# Patient Record
Sex: FEMALE | Race: WHITE | NOT HISPANIC OR LATINO | Employment: FULL TIME | URBAN - METROPOLITAN AREA
[De-identification: names, ages, dates, MRNs, and addresses within clinical notes are randomized per-mention and may not be internally consistent; named-entity substitution may affect disease eponyms.]

---

## 2023-11-30 LAB
NON-UH HIE A/G RATIO: 1.1
NON-UH HIE ALB: 3.8 G/DL (ref 3.4–5)
NON-UH HIE ALK PHOS: 119 UNIT/L (ref 45–117)
NON-UH HIE BILIRUBIN, TOTAL: 0.3 MG/DL (ref 0.3–1.2)
NON-UH HIE BUN/CREAT RATIO: 20
NON-UH HIE BUN: 18 MG/DL (ref 9–23)
NON-UH HIE CALCIUM: 9.4 MG/DL (ref 8.7–10.4)
NON-UH HIE CALCULATED LDL CHOLESTEROL: 140 MG/DL (ref 60–130)
NON-UH HIE CALCULATED OSMOLALITY: 284 MOSM/KG (ref 275–295)
NON-UH HIE CHLORIDE: 110 MMOL/L (ref 98–107)
NON-UH HIE CHOLESTEROL: 211 MG/DL (ref 100–200)
NON-UH HIE CO2, VENOUS: 25 MMOL/L (ref 20–31)
NON-UH HIE CREATININE: 0.9 MG/DL (ref 0.5–0.8)
NON-UH HIE GFR AA: >60
NON-UH HIE GLOBULIN: 3.5 G/DL
NON-UH HIE GLOMERULAR FILTRATION RATE: >60 ML/MIN/1.73M?
NON-UH HIE GLUCOSE: 86 MG/DL (ref 74–106)
NON-UH HIE GOT: 19 UNIT/L (ref 15–37)
NON-UH HIE GPT: 15 UNIT/L (ref 10–49)
NON-UH HIE HCT: 42.8 % (ref 36–46)
NON-UH HIE HDL CHOLESTEROL: 47 MG/DL (ref 40–60)
NON-UH HIE HGB: 14.3 G/DL (ref 12–16)
NON-UH HIE INSTR WBC ND: 8.7
NON-UH HIE K: 4.6 MMOL/L (ref 3.5–5.1)
NON-UH HIE MCH: 29.7 PG (ref 27–34)
NON-UH HIE MCHC: 33.4 G/DL (ref 32–37)
NON-UH HIE MCV: 88.9 FL (ref 80–100)
NON-UH HIE MPV: 8.8 FL (ref 7.4–10.4)
NON-UH HIE NA: 142 MMOL/L (ref 135–145)
NON-UH HIE PLATELET: 235 X10 (ref 150–450)
NON-UH HIE RBC: 4.81 X10 (ref 4.2–5.4)
NON-UH HIE RDW: 14.3 % (ref 11.5–14.5)
NON-UH HIE TOTAL CHOL/HDL CHOL RATIO: 4.5
NON-UH HIE TOTAL PROTEIN: 7.3 G/DL (ref 5.7–8.2)
NON-UH HIE TRIGLYCERIDES: 121 MG/DL (ref 30–150)
NON-UH HIE WBC: 8.7 X10 (ref 4.5–11)

## 2023-12-04 PROBLEM — E78.5 HYPERLIPIDEMIA: Status: ACTIVE | Noted: 2023-12-04

## 2023-12-04 PROBLEM — N83.209 OVARIAN CYST: Status: ACTIVE | Noted: 2023-12-04

## 2023-12-04 PROBLEM — I10 HYPERTENSION: Status: ACTIVE | Noted: 2023-12-04

## 2023-12-04 PROBLEM — M47.816 DJD (DEGENERATIVE JOINT DISEASE), LUMBAR: Status: ACTIVE | Noted: 2023-12-04

## 2023-12-04 PROBLEM — R74.8 ALKALINE PHOSPHATASE ELEVATION: Status: ACTIVE | Noted: 2023-12-04

## 2023-12-04 RX ORDER — AMLODIPINE BESYLATE 10 MG/1
1 TABLET ORAL DAILY
COMMUNITY
End: 2024-06-06 | Stop reason: SDUPTHER

## 2023-12-04 RX ORDER — LOSARTAN POTASSIUM 50 MG/1
1.5 TABLET ORAL
COMMUNITY
End: 2024-06-06 | Stop reason: SDUPTHER

## 2023-12-04 RX ORDER — ATENOLOL 25 MG/1
1 TABLET ORAL DAILY
COMMUNITY
End: 2024-06-06 | Stop reason: SDUPTHER

## 2023-12-04 NOTE — PROGRESS NOTES
"   Chief Complaint   Patient presents with    Follow-up     Pt here for 6 mo FUV.  Pt c/o numbness and tingling to bilateral hands at night.  Onset 2-3 months.   Last evaluation 6/27/2023.     N/t x 2 months bilateral hands. Right worse than the left.  Dropping things. Slight neck pain.   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     Past Medical history  Hypertension. Patient reports negative study in the past  Hyperlipidemia  Obesity  Smoker  Right ovarian cyst  Alkaline phosphatase elevated.     Past surgical history negative     Social history. Single. No children. Lives with her mother. Smokes a pack ppd      Family history: Lives with mother Aracely Rodas. She is a patient of mine. Mother has hypertension diabetes and hypothyroidism. Father with CAD and hypertension    Blood pressure 110/70, pulse 63, temperature 36.5 °C (97.7 °F), height 1.626 m (5' 4\"), weight 99.8 kg (220 lb), SpO2 99 %.  Body mass index is 37.76 kg/m².    Vital reviewed  Neck: no cervical/clavicular adenopathy  CV: RRR S1 S2 normal. No murmur  Lungs: CTA without wrr. Breath sounds symmetric  Extremities: no pretibial edema  Neuro: speech intact.   Msk: tinel negative. Phalen's test positive    Labs 11/2023 db lipid, cbc, cmp  211/47/140/121  Wbc 8.7 hg 14.3 platelet 235  Cr 0.9 glucose 86 K 4.6 liver function tests negative.     Cleo was seen today for follow-up.  Diagnoses and all orders for this visit:  Numbness and tingling (Primary)  -     EMG & nerve conduction; Future  -     Referral to Orthopaedic Surgery; Future  Hyperlipidemia, unspecified hyperlipidemia type  -     pravastatin (Pravachol) 40 mg tablet; Take 1 tablet (40 mg) by mouth once daily.  -     Lipid Panel; Future  -     Comprehensive Metabolic Panel; Future  Hypertension, unspecified type  -     CBC; Future  Amenorrhea  -     FSH; Future  -     Tsh With Reflex To Free T4 If Abnormal; Future    Ldl above goal. Increase pravastatin " from 20 to 40 mg every day  CTS discussed treatment such as splints, injection, surgery. Referral to ortho  Notify pt of NCS results when available    Mammogram has appt next week  Pap due 2026  Needs colon cancer screening.

## 2023-12-05 ENCOUNTER — OFFICE VISIT (OUTPATIENT)
Dept: PRIMARY CARE | Facility: CLINIC | Age: 48
End: 2023-12-05
Payer: COMMERCIAL

## 2023-12-05 VITALS
DIASTOLIC BLOOD PRESSURE: 70 MMHG | WEIGHT: 220 LBS | TEMPERATURE: 97.7 F | BODY MASS INDEX: 37.56 KG/M2 | SYSTOLIC BLOOD PRESSURE: 110 MMHG | HEART RATE: 63 BPM | OXYGEN SATURATION: 99 % | HEIGHT: 64 IN

## 2023-12-05 DIAGNOSIS — N91.2 AMENORRHEA: ICD-10-CM

## 2023-12-05 DIAGNOSIS — E78.5 HYPERLIPIDEMIA, UNSPECIFIED HYPERLIPIDEMIA TYPE: ICD-10-CM

## 2023-12-05 DIAGNOSIS — R20.0 NUMBNESS AND TINGLING: Primary | ICD-10-CM

## 2023-12-05 DIAGNOSIS — R20.2 NUMBNESS AND TINGLING: Primary | ICD-10-CM

## 2023-12-05 DIAGNOSIS — I10 HYPERTENSION, UNSPECIFIED TYPE: ICD-10-CM

## 2023-12-05 PROCEDURE — 3078F DIAST BP <80 MM HG: CPT | Performed by: INTERNAL MEDICINE

## 2023-12-05 PROCEDURE — 99214 OFFICE O/P EST MOD 30 MIN: CPT | Performed by: INTERNAL MEDICINE

## 2023-12-05 PROCEDURE — 3074F SYST BP LT 130 MM HG: CPT | Performed by: INTERNAL MEDICINE

## 2023-12-05 RX ORDER — PRAVASTATIN SODIUM 20 MG/1
20 TABLET ORAL DAILY
COMMUNITY
Start: 2023-04-07 | End: 2023-12-05 | Stop reason: DRUGHIGH

## 2023-12-05 RX ORDER — PRAVASTATIN SODIUM 40 MG/1
40 TABLET ORAL DAILY
Qty: 90 TABLET | Refills: 3 | Status: SHIPPED | OUTPATIENT
Start: 2023-12-05 | End: 2024-06-06 | Stop reason: SDUPTHER

## 2023-12-05 ASSESSMENT — PATIENT HEALTH QUESTIONNAIRE - PHQ9
1. LITTLE INTEREST OR PLEASURE IN DOING THINGS: NOT AT ALL
2. FEELING DOWN, DEPRESSED OR HOPELESS: NOT AT ALL
SUM OF ALL RESPONSES TO PHQ9 QUESTIONS 1 & 2: 0

## 2023-12-05 ASSESSMENT — PAIN SCALES - GENERAL: PAINLEVEL: 4

## 2023-12-21 ENCOUNTER — TELEPHONE (OUTPATIENT)
Dept: PRIMARY CARE | Facility: CLINIC | Age: 48
End: 2023-12-21
Payer: COMMERCIAL

## 2023-12-21 NOTE — TELEPHONE ENCOUNTER
----- Message from Aracely Wright MD sent at 12/21/2023  1:10 PM EST -----  Please let patient know mammogram is normal.

## 2024-06-04 LAB
NON-UH HIE A/G RATIO: 1
NON-UH HIE ALB: 3.8 G/DL (ref 3.4–5)
NON-UH HIE ALK PHOS: 135 UNIT/L (ref 45–117)
NON-UH HIE BILIRUBIN, TOTAL: 0.3 MG/DL (ref 0.3–1.2)
NON-UH HIE BUN/CREAT RATIO: 26.2
NON-UH HIE BUN: 21 MG/DL (ref 9–23)
NON-UH HIE CALCIUM: 9.6 MG/DL (ref 8.7–10.4)
NON-UH HIE CALCULATED LDL CHOLESTEROL: 158 MG/DL (ref 60–130)
NON-UH HIE CALCULATED OSMOLALITY: 283 MOSM/KG (ref 275–295)
NON-UH HIE CHLORIDE: 110 MMOL/L (ref 98–107)
NON-UH HIE CHOLESTEROL: 226 MG/DL (ref 100–200)
NON-UH HIE CO2, VENOUS: 24 MMOL/L (ref 20–31)
NON-UH HIE CREATININE: 0.8 MG/DL (ref 0.5–0.8)
NON-UH HIE FSH: 61 IU/L
NON-UH HIE GFR AA: >60
NON-UH HIE GLOBULIN: 3.8 G/DL
NON-UH HIE GLOMERULAR FILTRATION RATE: >60 ML/MIN/1.73M?
NON-UH HIE GLUCOSE: 83 MG/DL (ref 74–106)
NON-UH HIE GOT: 18 UNIT/L (ref 15–37)
NON-UH HIE GPT: 14 UNIT/L (ref 10–49)
NON-UH HIE HCT: 42.8 % (ref 36–46)
NON-UH HIE HDL CHOLESTEROL: 46 MG/DL (ref 40–60)
NON-UH HIE HGB: 14.5 G/DL (ref 12–16)
NON-UH HIE INSTR WBC ND: 8.2
NON-UH HIE K: 4.5 MMOL/L (ref 3.5–5.1)
NON-UH HIE MCH: 30.3 PG (ref 27–34)
NON-UH HIE MCHC: 33.8 G/DL (ref 32–37)
NON-UH HIE MCV: 89.7 FL (ref 80–100)
NON-UH HIE MPV: 9.1 FL (ref 7.4–10.4)
NON-UH HIE NA: 141 MMOL/L (ref 135–145)
NON-UH HIE PLATELET: 214 X10 (ref 150–450)
NON-UH HIE RBC: 4.77 X10 (ref 4.2–5.4)
NON-UH HIE RDW: 13.4 % (ref 11.5–14.5)
NON-UH HIE TOTAL CHOL/HDL CHOL RATIO: 4.9
NON-UH HIE TOTAL PROTEIN: 7.6 G/DL (ref 5.7–8.2)
NON-UH HIE TRIGLYCERIDES: 111 MG/DL (ref 30–150)
NON-UH HIE TSH: 3.91 UIU/ML (ref 0.55–4.78)
NON-UH HIE WBC: 8.2 X10 (ref 4.5–11)

## 2024-06-06 ENCOUNTER — OFFICE VISIT (OUTPATIENT)
Dept: PRIMARY CARE | Facility: CLINIC | Age: 49
End: 2024-06-06
Payer: COMMERCIAL

## 2024-06-06 VITALS
BODY MASS INDEX: 36.91 KG/M2 | DIASTOLIC BLOOD PRESSURE: 62 MMHG | OXYGEN SATURATION: 97 % | HEART RATE: 66 BPM | WEIGHT: 216.2 LBS | TEMPERATURE: 98 F | SYSTOLIC BLOOD PRESSURE: 110 MMHG | HEIGHT: 64 IN

## 2024-06-06 DIAGNOSIS — E55.9 VITAMIN D DEFICIENCY: ICD-10-CM

## 2024-06-06 DIAGNOSIS — I10 HYPERTENSION, UNSPECIFIED TYPE: Primary | ICD-10-CM

## 2024-06-06 DIAGNOSIS — E78.5 HYPERLIPIDEMIA, UNSPECIFIED HYPERLIPIDEMIA TYPE: ICD-10-CM

## 2024-06-06 PROCEDURE — 3078F DIAST BP <80 MM HG: CPT | Performed by: INTERNAL MEDICINE

## 2024-06-06 PROCEDURE — 99214 OFFICE O/P EST MOD 30 MIN: CPT | Performed by: INTERNAL MEDICINE

## 2024-06-06 PROCEDURE — 3074F SYST BP LT 130 MM HG: CPT | Performed by: INTERNAL MEDICINE

## 2024-06-06 RX ORDER — AMLODIPINE BESYLATE 10 MG/1
10 TABLET ORAL DAILY
Qty: 90 TABLET | Refills: 3 | Status: SHIPPED | OUTPATIENT
Start: 2024-06-06

## 2024-06-06 RX ORDER — PRAVASTATIN SODIUM 40 MG/1
40 TABLET ORAL DAILY
Qty: 90 TABLET | Refills: 3 | Status: SHIPPED | OUTPATIENT
Start: 2024-06-06

## 2024-06-06 RX ORDER — LOSARTAN POTASSIUM 50 MG/1
75 TABLET ORAL
Qty: 135 TABLET | Refills: 3 | Status: SHIPPED | OUTPATIENT
Start: 2024-06-06

## 2024-06-06 RX ORDER — ATENOLOL 25 MG/1
25 TABLET ORAL DAILY
Qty: 90 TABLET | Refills: 3 | Status: SHIPPED | OUTPATIENT
Start: 2024-06-06

## 2024-06-06 ASSESSMENT — PAIN SCALES - GENERAL: PAINLEVEL: 0-NO PAIN

## 2024-06-06 NOTE — PROGRESS NOTES
"   Chief Complaint   Patient presents with    Follow-up     Pt here for 6 mo FUV   Last evaluation 12/2023.   CTS-did not see ortho yet. Symptoms are better  Forgetting cholesterol med most days  No GI or  complaints  Taking mvi    Past Medical history  Hypertension. Patient reports negative study in the past  Hyperlipidemia  Obesity  Smoker  Right ovarian cyst  Alkaline phosphatase elevated.     Past surgical history negative     Social history. Single. No children. Lives with her mother. Smokes a pack ppd      Family history: Lives with mother Aracely Rodas. She is a patient of mine. Mother has hypertension diabetes and hypothyroidism. Father with CAD and hypertension    Blood pressure 110/62, pulse 66, temperature 36.7 °C (98 °F), height 1.626 m (5' 4\"), weight 98.1 kg (216 lb 3.2 oz), SpO2 97%.  Body mass index is 37.11 kg/m².    Vital reviewed  Neck: no cervical/clavicular adenopathy  CV: RRR S1 S2 normal. No murmur. No carotid bruit.   Lungs: CTA without wrr. Breath sounds symmetric  Abdomen: normoactive. Soft, nontender. No mass. Limited exam  Extremities: no pretibial edema  Neuro: speech intact.     Labs 6/2024 fsh,lipid, cmp, tsh, cbc  FSH 61 consistent with postmenopausal state.  Cholesterol 226 HDL 46  triglyceride 111.  Increased from prior lab work  Alk phos elevated at 135.  Previously elevated did have ultrasound in the past that showed fatty liver  Hemoglobin normal at 14.5    Labs 11/2023 db lipid, cbc, cmp  211/47/140/121  Wbc 8.7 hg 14.3 platelet 235  Cr 0.9 glucose 86 K 4.6 liver function tests negative.     1. Hyperlipidemia, unspecified hyperlipidemia type  Trouble with compliance. Does take amlodipine daily at night so can take together  - pravastatin (Pravachol) 40 mg tablet; Take 1 tablet (40 mg) by mouth once daily.  Dispense: 90 tablet; Refill: 3  - Lipid Panel; Future    2. Hypertension, unspecified type  Well controlled. Denies cv symptoms  - amLODIPine (Norvasc) 10 mg tablet; " Take 1 tablet (10 mg) by mouth once daily.  Dispense: 90 tablet; Refill: 3  - atenolol (Tenormin) 25 mg tablet; Take 1 tablet (25 mg) by mouth once daily.  Dispense: 90 tablet; Refill: 3  - losartan (Cozaar) 50 mg tablet; Take 1.5 tablets (75 mg) by mouth once daily.  Dispense: 135 tablet; Refill: 3  - Comprehensive Metabolic Panel; Future  - CBC; Future    3. Vitamin D deficiency  - Vitamin D 25-Hydroxy,Total (for eval of Vitamin D levels); Future    Mammogram 12/2023  Pap due 2026  Needs colon cancer screening.      Current Outpatient Medications on File Prior to Visit   Medication Sig Dispense Refill    amLODIPine (Norvasc) 10 mg tablet Take 1 tablet (10 mg) by mouth once daily.      atenolol (Tenormin) 25 mg tablet Take 1 tablet (25 mg) by mouth once daily.      losartan (Cozaar) 50 mg tablet Take 1.5 tablets (75 mg) by mouth once daily.      pravastatin (Pravachol) 40 mg tablet Take 1 tablet (40 mg) by mouth once daily. 90 tablet 3     No current facility-administered medications on file prior to visit.

## 2024-12-07 LAB
NON-UH HIE A/G RATIO: 1
NON-UH HIE ALB: 3.7 G/DL (ref 3.4–5)
NON-UH HIE ALK PHOS: 114 UNIT/L (ref 45–117)
NON-UH HIE BILIRUBIN, TOTAL: 0.5 MG/DL (ref 0.3–1.2)
NON-UH HIE BUN/CREAT RATIO: 15.6
NON-UH HIE BUN: 14 MG/DL (ref 9–23)
NON-UH HIE CALCIUM: 10.1 MG/DL (ref 8.7–10.4)
NON-UH HIE CALCULATED LDL CHOLESTEROL: 96 MG/DL (ref 60–130)
NON-UH HIE CALCULATED OSMOLALITY: 285 MOSM/KG (ref 275–295)
NON-UH HIE CHLORIDE: 111 MMOL/L (ref 98–107)
NON-UH HIE CHOLESTEROL: 170 MG/DL (ref 100–200)
NON-UH HIE CO2, VENOUS: 27 MMOL/L (ref 20–31)
NON-UH HIE CREATININE: 0.9 MG/DL (ref 0.5–0.8)
NON-UH HIE GFR AA: >60
NON-UH HIE GLOBULIN: 3.8 G/DL
NON-UH HIE GLOMERULAR FILTRATION RATE: >60 ML/MIN/1.73M?
NON-UH HIE GLUCOSE: 86 MG/DL (ref 74–106)
NON-UH HIE GOT: 17 UNIT/L (ref 15–37)
NON-UH HIE GPT: 11 UNIT/L (ref 10–49)
NON-UH HIE HCT: 41.5 % (ref 36–46)
NON-UH HIE HDL CHOLESTEROL: 47 MG/DL (ref 40–60)
NON-UH HIE HGB: 14 G/DL (ref 12–16)
NON-UH HIE INSTR WBC ND: 8.6
NON-UH HIE K: 4.7 MMOL/L (ref 3.5–5.1)
NON-UH HIE MCH: 30.2 PG (ref 27–34)
NON-UH HIE MCHC: 33.8 G/DL (ref 32–37)
NON-UH HIE MCV: 89.2 FL (ref 80–100)
NON-UH HIE MPV: 8.7 FL (ref 7.4–10.4)
NON-UH HIE NA: 143 MMOL/L (ref 135–145)
NON-UH HIE PLATELET: 233 X10 (ref 150–450)
NON-UH HIE RBC: 4.65 X10 (ref 4.2–5.4)
NON-UH HIE RDW: 13.6 % (ref 11.5–14.5)
NON-UH HIE TOTAL CHOL/HDL CHOL RATIO: 3.6
NON-UH HIE TOTAL PROTEIN: 7.5 G/DL (ref 5.7–8.2)
NON-UH HIE TRIGLYCERIDES: 137 MG/DL (ref 30–150)
NON-UH HIE VIT D 25: 7 NG/ML
NON-UH HIE WBC: 8.6 X10 (ref 4.5–11)

## 2024-12-10 ENCOUNTER — APPOINTMENT (OUTPATIENT)
Dept: PRIMARY CARE | Facility: CLINIC | Age: 49
End: 2024-12-10
Payer: COMMERCIAL

## 2024-12-10 VITALS
DIASTOLIC BLOOD PRESSURE: 68 MMHG | SYSTOLIC BLOOD PRESSURE: 118 MMHG | HEIGHT: 64 IN | HEART RATE: 68 BPM | BODY MASS INDEX: 38.28 KG/M2 | TEMPERATURE: 97.7 F | OXYGEN SATURATION: 98 % | WEIGHT: 224.2 LBS

## 2024-12-10 DIAGNOSIS — Z12.11 COLON CANCER SCREENING: ICD-10-CM

## 2024-12-10 DIAGNOSIS — E55.9 VITAMIN D DEFICIENCY: Primary | ICD-10-CM

## 2024-12-10 DIAGNOSIS — F17.210 CIGARETTE SMOKER MOTIVATED TO QUIT: ICD-10-CM

## 2024-12-10 DIAGNOSIS — Z12.31 ENCOUNTER FOR SCREENING MAMMOGRAM FOR BREAST CANCER: ICD-10-CM

## 2024-12-10 DIAGNOSIS — I10 HYPERTENSION, UNSPECIFIED TYPE: ICD-10-CM

## 2024-12-10 PROCEDURE — 99213 OFFICE O/P EST LOW 20 MIN: CPT | Performed by: INTERNAL MEDICINE

## 2024-12-10 PROCEDURE — 3008F BODY MASS INDEX DOCD: CPT | Performed by: INTERNAL MEDICINE

## 2024-12-10 PROCEDURE — 3074F SYST BP LT 130 MM HG: CPT | Performed by: INTERNAL MEDICINE

## 2024-12-10 PROCEDURE — 3078F DIAST BP <80 MM HG: CPT | Performed by: INTERNAL MEDICINE

## 2024-12-10 RX ORDER — ERGOCALCIFEROL 1.25 MG/1
CAPSULE ORAL
Qty: 12 CAPSULE | Refills: 0 | Status: SHIPPED | OUTPATIENT
Start: 2024-12-10

## 2024-12-10 ASSESSMENT — PAIN SCALES - GENERAL: PAINLEVEL_OUTOF10: 0-NO PAIN

## 2024-12-10 ASSESSMENT — PATIENT HEALTH QUESTIONNAIRE - PHQ9
2. FEELING DOWN, DEPRESSED OR HOPELESS: NOT AT ALL
1. LITTLE INTEREST OR PLEASURE IN DOING THINGS: NOT AT ALL
SUM OF ALL RESPONSES TO PHQ9 QUESTIONS 1 & 2: 0

## 2024-12-10 NOTE — PROGRESS NOTES
"  Chief Complaint   Patient presents with    Follow-up     Pt here for 6 mo FUV   Last evaluation  06/2024. Still smoking-aware of need to quit.  Did lab work. Does feel tired.     Past Medical history  Hypertension.   Hyperlipidemia  Obesity  Smoker  Right ovarian cyst  Alkaline phosphatase elevated.  CTS    Past surgical history negative     Social history. Single. No children. Lives with her mother. Smokes a pack ppd      Family history: Lives with mother Aracely Rodas. She is a patient of mine. Mother has hypertension diabetes and hypothyroidism. Father with CAD and hypertension    Blood pressure 118/68, pulse 68, temperature 36.5 °C (97.7 °F), height 1.626 m (5' 4\"), weight 102 kg (224 lb 3.2 oz), SpO2 98%.  Body mass index is 38.48 kg/m².    Vital reviewed  Neck: no cervical/clavicular adenopathy  CV: RRR S1 S2 normal. No murmur. No carotid bruit.   Lungs: CTA without wrr. Breath sounds symmetric  Abdomen: normoactive. Soft, nontender. No mass. Limited exam  Extremities: no pretibial edema  Neuro: speech intact.   Skin: no rash noted.     Labs 12/2024 D, lipid, CMP, cbc  D low 7  Cr 0.9 Glucose 86 liver function tests negative  Wbc 8.6 hg 14 platelet 233  170/47/96/137    Labs 6/2024 fsh, lipid, cmp, tsh, cbc  FSH 61 consistent with postmenopausal state.  Cholesterol 226 HDL 46  triglyceride 111.  Increased from prior lab work  Alk phos elevated at 135.  Previously elevated did have ultrasound in the past that showed fatty liver  Hemoglobin normal at 14.5    Labs 11/2023 db lipid, cbc, cmp  211/47/140/121  Wbc 8.7 hg 14.3 platelet 235  Cr 0.9 glucose 86 K 4.6 liver function tests negative.     1. Vitamin D deficiency (Primary)  - ergocalciferol (Vitamin D2) 1.25 MG (57804 UT) capsule; 1 po weekly for 12 weeks. After completing take otc vitamin D 2000 units a day.  Dispense: 12 capsule; Refill: 0  - Vitamin D 25-Hydroxy,Total (for eval of Vitamin D levels); Future    2. Encounter for screening mammogram " for breast cancer  - BI mammo bilateral screening tomosynthesis; Future    3. Colon cancer screening  Discussed options. She prefers scope.   - Colonoscopy Screening; Average Risk Patient; Future    4. Cigarette smoker motivated to quit   CT lung screening low dose; Future    5. Hypertension, unspecified type  Bp within goal. Asymptomatic. Labs in 6 months. Reviewed 12/2024 lab  - Comprehensive Metabolic Panel; Future    Mammogram 12/2023 ordered  Pap due 2026  Needs colon cancer screening. Scope ordered  F/up 6 months and prn     Current Outpatient Medications on File Prior to Visit   Medication Sig Dispense Refill    amLODIPine (Norvasc) 10 mg tablet Take 1 tablet (10 mg) by mouth once daily. 90 tablet 3    atenolol (Tenormin) 25 mg tablet Take 1 tablet (25 mg) by mouth once daily. 90 tablet 3    losartan (Cozaar) 50 mg tablet Take 1.5 tablets (75 mg) by mouth once daily. 135 tablet 3    pravastatin (Pravachol) 40 mg tablet Take 1 tablet (40 mg) by mouth once daily. 90 tablet 3     No current facility-administered medications on file prior to visit.

## 2025-06-09 DIAGNOSIS — I10 HYPERTENSION, UNSPECIFIED TYPE: ICD-10-CM

## 2025-06-09 DIAGNOSIS — E78.5 HYPERLIPIDEMIA, UNSPECIFIED HYPERLIPIDEMIA TYPE: ICD-10-CM

## 2025-06-09 RX ORDER — LOSARTAN POTASSIUM 50 MG/1
75 TABLET ORAL
Qty: 135 TABLET | Refills: 3 | Status: SHIPPED | OUTPATIENT
Start: 2025-06-09

## 2025-06-09 RX ORDER — ATENOLOL 25 MG/1
25 TABLET ORAL DAILY
Qty: 90 TABLET | Refills: 3 | Status: SHIPPED | OUTPATIENT
Start: 2025-06-09

## 2025-06-09 RX ORDER — AMLODIPINE BESYLATE 10 MG/1
10 TABLET ORAL DAILY
Qty: 90 TABLET | Refills: 3 | Status: SHIPPED | OUTPATIENT
Start: 2025-06-09

## 2025-06-09 RX ORDER — PRAVASTATIN SODIUM 40 MG/1
40 TABLET ORAL DAILY
Qty: 90 TABLET | Refills: 3 | Status: SHIPPED | OUTPATIENT
Start: 2025-06-09

## 2025-06-11 ENCOUNTER — APPOINTMENT (OUTPATIENT)
Dept: PRIMARY CARE | Facility: CLINIC | Age: 50
End: 2025-06-11
Payer: COMMERCIAL

## 2025-06-26 LAB
NON-UH HIE A/G RATIO: 1
NON-UH HIE ALB: 3.8 G/DL (ref 3.4–5)
NON-UH HIE ALK PHOS: 119 UNIT/L (ref 45–117)
NON-UH HIE BILIRUBIN, TOTAL: 0.5 MG/DL (ref 0.3–1.2)
NON-UH HIE BUN/CREAT RATIO: 23.3
NON-UH HIE BUN: 21 MG/DL (ref 9–23)
NON-UH HIE CALCIUM: 9.7 MG/DL (ref 8.7–10.4)
NON-UH HIE CALCULATED OSMOLALITY: 282 MOSM/KG (ref 275–295)
NON-UH HIE CHLORIDE: 114 MMOL/L (ref 98–107)
NON-UH HIE CO2, VENOUS: 24 MMOL/L (ref 20–31)
NON-UH HIE CREATININE: 0.9 MG/DL (ref 0.5–0.8)
NON-UH HIE GFR AA: >60
NON-UH HIE GLOBULIN: 3.8 G/DL
NON-UH HIE GLOMERULAR FILTRATION RATE: >60 ML/MIN/1.73M?
NON-UH HIE GLUCOSE: 83 MG/DL (ref 74–106)
NON-UH HIE GOT: 17 UNIT/L (ref 15–37)
NON-UH HIE GPT: 9 UNIT/L (ref 10–49)
NON-UH HIE K: 4 MMOL/L (ref 3.5–5.1)
NON-UH HIE NA: 140 MMOL/L (ref 135–145)
NON-UH HIE TOTAL PROTEIN: 7.6 G/DL (ref 5.7–8.2)
NON-UH HIE VIT D 25: 31 NG/ML

## 2025-06-30 ENCOUNTER — APPOINTMENT (OUTPATIENT)
Dept: PRIMARY CARE | Facility: CLINIC | Age: 50
End: 2025-06-30
Payer: COMMERCIAL

## 2025-06-30 ENCOUNTER — TELEPHONE (OUTPATIENT)
Dept: PRIMARY CARE | Facility: CLINIC | Age: 50
End: 2025-06-30

## 2025-06-30 VITALS
BODY MASS INDEX: 37.05 KG/M2 | WEIGHT: 217 LBS | TEMPERATURE: 98.1 F | DIASTOLIC BLOOD PRESSURE: 70 MMHG | OXYGEN SATURATION: 99 % | HEART RATE: 71 BPM | SYSTOLIC BLOOD PRESSURE: 108 MMHG | HEIGHT: 64 IN

## 2025-06-30 DIAGNOSIS — E66.812 CLASS 2 OBESITY WITH BODY MASS INDEX (BMI) OF 37.0 TO 37.9 IN ADULT, UNSPECIFIED OBESITY TYPE, UNSPECIFIED WHETHER SERIOUS COMORBIDITY PRESENT: ICD-10-CM

## 2025-06-30 DIAGNOSIS — E55.9 VITAMIN D DEFICIENCY: Primary | ICD-10-CM

## 2025-06-30 DIAGNOSIS — Z12.31 ENCOUNTER FOR SCREENING MAMMOGRAM FOR BREAST CANCER: ICD-10-CM

## 2025-06-30 DIAGNOSIS — E78.5 HYPERLIPIDEMIA, UNSPECIFIED HYPERLIPIDEMIA TYPE: ICD-10-CM

## 2025-06-30 DIAGNOSIS — F17.210 CIGARETTE SMOKER MOTIVATED TO QUIT: ICD-10-CM

## 2025-06-30 DIAGNOSIS — I10 HYPERTENSION, UNSPECIFIED TYPE: ICD-10-CM

## 2025-06-30 DIAGNOSIS — Z00.00 ROUTINE GENERAL MEDICAL EXAMINATION AT A HEALTH CARE FACILITY: ICD-10-CM

## 2025-06-30 PROCEDURE — 3074F SYST BP LT 130 MM HG: CPT | Performed by: INTERNAL MEDICINE

## 2025-06-30 PROCEDURE — 99396 PREV VISIT EST AGE 40-64: CPT | Performed by: INTERNAL MEDICINE

## 2025-06-30 PROCEDURE — 3078F DIAST BP <80 MM HG: CPT | Performed by: INTERNAL MEDICINE

## 2025-06-30 PROCEDURE — 99213 OFFICE O/P EST LOW 20 MIN: CPT | Performed by: INTERNAL MEDICINE

## 2025-06-30 PROCEDURE — 3008F BODY MASS INDEX DOCD: CPT | Performed by: INTERNAL MEDICINE

## 2025-06-30 RX ORDER — CHOLECALCIFEROL (VITAMIN D3) 50 MCG
50 TABLET ORAL DAILY
COMMUNITY

## 2025-06-30 ASSESSMENT — LIFESTYLE VARIABLES
AUDIT-C TOTAL SCORE: 2
HOW OFTEN DO YOU HAVE SIX OR MORE DRINKS ON ONE OCCASION: NEVER
HOW MANY STANDARD DRINKS CONTAINING ALCOHOL DO YOU HAVE ON A TYPICAL DAY: 1 OR 2
SKIP TO QUESTIONS 9-10: 1
HOW OFTEN DO YOU HAVE A DRINK CONTAINING ALCOHOL: 2-4 TIMES A MONTH

## 2025-06-30 ASSESSMENT — PATIENT HEALTH QUESTIONNAIRE - PHQ9
SUM OF ALL RESPONSES TO PHQ9 QUESTIONS 1 & 2: 0
1. LITTLE INTEREST OR PLEASURE IN DOING THINGS: NOT AT ALL
2. FEELING DOWN, DEPRESSED OR HOPELESS: NOT AT ALL

## 2025-06-30 ASSESSMENT — PAIN SCALES - GENERAL: PAINLEVEL_OUTOF10: 0-NO PAIN

## 2025-06-30 NOTE — TELEPHONE ENCOUNTER
CT Low dose screening/35159 for SWG is approved auth# [id number] valid 6/30--8/14/25 per [facility] portal

## 2025-06-30 NOTE — PROGRESS NOTES
"  Chief Complaint   Patient presents with    Annual Exam    Follow-up     Pt here for AWV and 6 mo FUV   Last evaluation  12/2024. Still smoking-aware of need to quit. Smoking 1 ppd.  Needs new order for CT lung ca screen and mammogram  Taking otc vitamin D.  Patient denies chest pain, pressure, palpitations, or shortness of breath.  Patient denies abdominal pain. No  complaints. Denies blood in urine or stool. Occasional leak small amount of urine. .       Past Medical history  Hypertension.   Hyperlipidemia  Obesity  Smoker  Right ovarian cyst  Alkaline phosphatase elevated.  CTS  Vitamin D deficiency    Past surgical history negative     Social history. Single. No children. Lives with her mother. Smokes a pack ppd      Family history: Lives with mother Aracely Rodas.  Mother has hypertension diabetes and hypothyroidism. Father with CAD and hypertension    Blood pressure 108/70, pulse 71, temperature 36.7 °C (98.1 °F), height 1.626 m (5' 4\"), weight 98.4 kg (217 lb), SpO2 99%.  Body mass index is 37.25 kg/m².  General: NAD. Alert.  Glasses.   HEENT: Normocephalic atraumatic.    Eyes: no scleral icterus. Extraocular movements intact.  Pupils equal round and reactive to light.  Ears: TM intact.  No cerumen. Hearing grossly intact.   Throat: No exudate  Neck:  Supple. No thyroid goiter.  Lymph nodes: No cervical or clavicular adenopathy  Cardiovascular: Regular rate rhythm S1-S2 normal no murmur. No carotid bruit.   Lungs: Clear to Auscultation without wheezing, rales, or rhonchi, Breath sounds symmetric. No use of accessory muscles  Abdomen:  Normoactive bowel sounds, soft, non-tender.   Extremities: No pretibial edema  Neuro: no facial asymmetry. Strength upper and lower extremities 5/5. Sensation intact to light touch. No tremor. Babinski negative. CN III-XII grossly intact.   Skin: no rash noted  Vascular: DP pulses intact.  No cyanosis.   Declined chaperone. Breast: Both are symmetrical no nipple discharge.  No " skin changes.  No mass palpated.  No axillary adenopathy.          Labs 06/2025 D, CMP  D 31 cr 0.9 glucose 83 alk phos elevated 119.     Labs 12/2024 D, lipid, CMP, cbc  D low 7  Cr 0.9 Glucose 86 liver function tests negative  Wbc 8.6 hg 14 platelet 233  170/47/96/137    Labs 6/2024 fsh, lipid, cmp, tsh, cbc  FSH 61 consistent with postmenopausal state.  Cholesterol 226 HDL 46  triglyceride 111.  Increased from prior lab work  Alk phos elevated at 135.  Previously elevated did have ultrasound in the past that showed fatty liver  Hemoglobin normal at 14.5    Labs 11/2023 db lipid, cbc, cmp  211/47/140/121  Wbc 8.7 hg 14.3 platelet 235  Cr 0.9 glucose 86 K 4.6 liver function tests negative.     1. Vitamin D deficiency (Primary)  - Vitamin D 25-Hydroxy,Total (for eval of Vitamin D levels); Future    2. Class 2 obesity with body mass index (BMI) of 37.0 to 37.9 in adult, unspecified obesity type, unspecified whether serious comorbidity present    3. Encounter for screening mammogram for breast cancer  - BI mammo bilateral screening tomosynthesis; Future    4. Cigarette smoker motivated to quit  - CT lung screening low dose; Future    5. Routine general medical examination at a health care facility    6. Hyperlipidemia, unspecified hyperlipidemia type  - Lipid Panel; Future  - Comprehensive Metabolic Panel; Future    7. Hypertension, unspecified type  Well controlled. Asymptomatic.   - Lipid Panel; Future  - Comprehensive Metabolic Panel; Future  - CBC; Future    Mammogram 12/2023 ordered  Pap due 2026  Needs colon cancer screening. Scope ordered  F/up 6 months and prn     Current Outpatient Medications on File Prior to Visit   Medication Sig Dispense Refill    amLODIPine (Norvasc) 10 mg tablet Take 1 tablet (10 mg) by mouth once daily. 90 tablet 3    atenolol (Tenormin) 25 mg tablet Take 1 tablet (25 mg) by mouth once daily. 90 tablet 3    cholecalciferol (Vitamin D3) 50 mcg (2,000 units) tablet Take 1 tablet  (50 mcg) by mouth once daily.      losartan (Cozaar) 50 mg tablet Take 1.5 tablets (75 mg) by mouth once daily. 135 tablet 3    pravastatin (Pravachol) 40 mg tablet Take 1 tablet (40 mg) by mouth once daily. 90 tablet 3    [DISCONTINUED] ergocalciferol (Vitamin D2) 1.25 MG (82241 UT) capsule 1 po weekly for 12 weeks. After completing take otc vitamin D 2000 units a day. (Patient not taking: Reported on 6/30/2025) 12 capsule 0     No current facility-administered medications on file prior to visit.

## 2025-07-01 ENCOUNTER — TELEPHONE (OUTPATIENT)
Dept: CARDIOLOGY | Facility: CLINIC | Age: 50
End: 2025-07-01
Payer: COMMERCIAL

## 2025-07-01 NOTE — TELEPHONE ENCOUNTER
Scheduled Lung Screening Exam at [facility] for 7/8/25 at 12:30pm with a 12:15pm arrival. Tried to call the patient to give her the appointment details and prep however her voicemail box is not set up. Will try again this afternoon.

## 2025-07-01 NOTE — TELEPHONE ENCOUNTER
CT Lung Screening-I was able to reach and speak to the patient-I provided her with the appointment details, prep and the phone number to central scheduling should the patient need to reschedule her appointment for 7/8/25 at [facility].

## 2025-07-11 ENCOUNTER — TELEPHONE (OUTPATIENT)
Dept: PRIMARY CARE | Facility: CLINIC | Age: 50
End: 2025-07-11
Payer: COMMERCIAL

## 2025-07-11 NOTE — TELEPHONE ENCOUNTER
----- Message from Aracely Wright sent at 7/10/2025  5:27 PM EDT -----  CT scan to screen for lung cancer shows small nonsuspicious lung nodules.  Radiologist indicates can repeat screening CT scan in 1 year.  Of note there is mild calcification coronary blood vessels.    This would imply plaque buildup.  Recommend avoid fat foods such as red meat or fried foods.  Plan on repeating CT scan in 1 year  ----- Message -----  From: Belén Shoemaker - Imaging Results In  Sent: 7/9/2025   9:08 PM EDT  To: Aracely Wright MD

## 2025-12-29 ENCOUNTER — APPOINTMENT (OUTPATIENT)
Dept: PRIMARY CARE | Facility: CLINIC | Age: 50
End: 2025-12-29
Payer: COMMERCIAL